# Patient Record
Sex: FEMALE | Race: OTHER | HISPANIC OR LATINO | Employment: FULL TIME | URBAN - METROPOLITAN AREA
[De-identification: names, ages, dates, MRNs, and addresses within clinical notes are randomized per-mention and may not be internally consistent; named-entity substitution may affect disease eponyms.]

---

## 2022-12-21 ENCOUNTER — APPOINTMENT (EMERGENCY)
Dept: CT IMAGING | Facility: HOSPITAL | Age: 32
End: 2022-12-21

## 2022-12-21 ENCOUNTER — HOSPITAL ENCOUNTER (EMERGENCY)
Facility: HOSPITAL | Age: 32
Discharge: HOME | End: 2022-12-21
Attending: EMERGENCY MEDICINE

## 2022-12-21 VITALS
SYSTOLIC BLOOD PRESSURE: 109 MMHG | DIASTOLIC BLOOD PRESSURE: 65 MMHG | HEART RATE: 72 BPM | RESPIRATION RATE: 16 BRPM | OXYGEN SATURATION: 97 % | TEMPERATURE: 97.9 F

## 2022-12-21 DIAGNOSIS — R51.9 HEADACHE: Primary | ICD-10-CM

## 2022-12-21 LAB
ALBUMIN SERPL BCP-MCNC: 3.5 G/DL (ref 3.5–5)
ALP SERPL-CCNC: 93 U/L (ref 46–116)
ALT SERPL W P-5'-P-CCNC: 23 U/L (ref 12–78)
ANION GAP SERPL CALCULATED.3IONS-SCNC: 9 MMOL/L (ref 4–13)
AST SERPL W P-5'-P-CCNC: 10 U/L (ref 5–45)
BASOPHILS # BLD AUTO: 0.03 THOUSANDS/ÂΜL (ref 0–0.1)
BASOPHILS NFR BLD AUTO: 1 % (ref 0–1)
BILIRUB SERPL-MCNC: 0.12 MG/DL (ref 0.2–1)
BUN SERPL-MCNC: 18 MG/DL (ref 5–25)
CALCIUM SERPL-MCNC: 9 MG/DL (ref 8.3–10.1)
CHLORIDE SERPL-SCNC: 101 MMOL/L (ref 96–108)
CO2 SERPL-SCNC: 28 MMOL/L (ref 21–32)
CREAT SERPL-MCNC: 0.96 MG/DL (ref 0.6–1.3)
EOSINOPHIL # BLD AUTO: 0.27 THOUSAND/ÂΜL (ref 0–0.61)
EOSINOPHIL NFR BLD AUTO: 4 % (ref 0–6)
ERYTHROCYTE [DISTWIDTH] IN BLOOD BY AUTOMATED COUNT: 11.5 % (ref 11.6–15.1)
EXT PREGNANCY TEST URINE: NEGATIVE
EXT. CONTROL: NORMAL
GFR SERPL CREATININE-BSD FRML MDRD: 77 ML/MIN/1.73SQ M
GLUCOSE SERPL-MCNC: 94 MG/DL (ref 65–140)
HCT VFR BLD AUTO: 36.8 % (ref 34.8–46.1)
HGB BLD-MCNC: 12.2 G/DL (ref 11.5–15.4)
IMM GRANULOCYTES # BLD AUTO: 0.02 THOUSAND/UL (ref 0–0.2)
IMM GRANULOCYTES NFR BLD AUTO: 0 % (ref 0–2)
LYMPHOCYTES # BLD AUTO: 2.71 THOUSANDS/ÂΜL (ref 0.6–4.47)
LYMPHOCYTES NFR BLD AUTO: 42 % (ref 14–44)
MAGNESIUM SERPL-MCNC: 1.8 MG/DL (ref 1.6–2.6)
MCH RBC QN AUTO: 29.9 PG (ref 26.8–34.3)
MCHC RBC AUTO-ENTMCNC: 33.2 G/DL (ref 31.4–37.4)
MCV RBC AUTO: 90 FL (ref 82–98)
MONOCYTES # BLD AUTO: 0.38 THOUSAND/ÂΜL (ref 0.17–1.22)
MONOCYTES NFR BLD AUTO: 6 % (ref 4–12)
NEUTROPHILS # BLD AUTO: 3.08 THOUSANDS/ÂΜL (ref 1.85–7.62)
NEUTS SEG NFR BLD AUTO: 47 % (ref 43–75)
NRBC BLD AUTO-RTO: 0 /100 WBCS
PLATELET # BLD AUTO: 349 THOUSANDS/UL (ref 149–390)
PMV BLD AUTO: 10.5 FL (ref 8.9–12.7)
POTASSIUM SERPL-SCNC: 3.8 MMOL/L (ref 3.5–5.3)
PROT SERPL-MCNC: 8 G/DL (ref 6.4–8.4)
RBC # BLD AUTO: 4.08 MILLION/UL (ref 3.81–5.12)
SODIUM SERPL-SCNC: 138 MMOL/L (ref 135–147)
WBC # BLD AUTO: 6.49 THOUSAND/UL (ref 4.31–10.16)

## 2022-12-21 RX ORDER — DIPHENHYDRAMINE HYDROCHLORIDE 50 MG/ML
25 INJECTION INTRAMUSCULAR; INTRAVENOUS ONCE
Status: COMPLETED | OUTPATIENT
Start: 2022-12-21 | End: 2022-12-21

## 2022-12-21 RX ORDER — ACETAMINOPHEN 325 MG/1
650 TABLET ORAL ONCE
Status: COMPLETED | OUTPATIENT
Start: 2022-12-21 | End: 2022-12-21

## 2022-12-21 RX ORDER — DEXAMETHASONE SODIUM PHOSPHATE 10 MG/ML
10 INJECTION, SOLUTION INTRAMUSCULAR; INTRAVENOUS ONCE
Status: COMPLETED | OUTPATIENT
Start: 2022-12-21 | End: 2022-12-21

## 2022-12-21 RX ORDER — MAGNESIUM SULFATE HEPTAHYDRATE 40 MG/ML
2 INJECTION, SOLUTION INTRAVENOUS ONCE
Status: COMPLETED | OUTPATIENT
Start: 2022-12-21 | End: 2022-12-21

## 2022-12-21 RX ORDER — BUTALBITAL, ACETAMINOPHEN AND CAFFEINE 50; 325; 40 MG/1; MG/1; MG/1
1 TABLET ORAL EVERY 8 HOURS PRN
Qty: 6 TABLET | Refills: 0 | Status: SHIPPED | OUTPATIENT
Start: 2022-12-21 | End: 2022-12-23

## 2022-12-21 RX ORDER — METOCLOPRAMIDE HYDROCHLORIDE 5 MG/ML
10 INJECTION INTRAMUSCULAR; INTRAVENOUS ONCE
Status: COMPLETED | OUTPATIENT
Start: 2022-12-21 | End: 2022-12-21

## 2022-12-21 RX ORDER — KETOROLAC TROMETHAMINE 30 MG/ML
30 INJECTION, SOLUTION INTRAMUSCULAR; INTRAVENOUS ONCE
Status: COMPLETED | OUTPATIENT
Start: 2022-12-21 | End: 2022-12-21

## 2022-12-21 RX ORDER — BUTALBITAL, ACETAMINOPHEN AND CAFFEINE 50; 325; 40 MG/1; MG/1; MG/1
1 TABLET ORAL ONCE
Status: COMPLETED | OUTPATIENT
Start: 2022-12-21 | End: 2022-12-21

## 2022-12-21 RX ORDER — ONDANSETRON 2 MG/ML
4 INJECTION INTRAMUSCULAR; INTRAVENOUS ONCE
Status: COMPLETED | OUTPATIENT
Start: 2022-12-21 | End: 2022-12-21

## 2022-12-21 RX ADMIN — BUTALBITAL, ACETAMINOPHEN AND CAFFEINE 1 TABLET: 50; 325; 40 TABLET ORAL at 19:08

## 2022-12-21 RX ADMIN — KETOROLAC TROMETHAMINE 30 MG: 30 INJECTION, SOLUTION INTRAMUSCULAR; INTRAVENOUS at 16:55

## 2022-12-21 RX ADMIN — MAGNESIUM SULFATE HEPTAHYDRATE 2 G: 40 INJECTION, SOLUTION INTRAVENOUS at 17:47

## 2022-12-21 RX ADMIN — DIPHENHYDRAMINE HYDROCHLORIDE 25 MG: 50 INJECTION, SOLUTION INTRAMUSCULAR; INTRAVENOUS at 16:54

## 2022-12-21 RX ADMIN — ACETAMINOPHEN 650 MG: 325 TABLET, FILM COATED ORAL at 17:46

## 2022-12-21 RX ADMIN — METOCLOPRAMIDE 10 MG: 5 INJECTION, SOLUTION INTRAMUSCULAR; INTRAVENOUS at 16:57

## 2022-12-21 RX ADMIN — SODIUM CHLORIDE 1000 ML: 0.9 INJECTION, SOLUTION INTRAVENOUS at 16:53

## 2022-12-21 RX ADMIN — ONDANSETRON 4 MG: 2 INJECTION INTRAMUSCULAR; INTRAVENOUS at 17:46

## 2022-12-21 RX ADMIN — DEXAMETHASONE SODIUM PHOSPHATE 10 MG: 10 INJECTION, SOLUTION INTRAMUSCULAR; INTRAVENOUS at 16:57

## 2022-12-21 NOTE — ED PROVIDER NOTES
History  Chief Complaint   Patient presents with   • Headache     Headache x2 weeks  Taking tylenol without relief  Patient is a 79-year-old female otherwise healthy with history of asthma only coming in today with a headache  Patient states that started approximately 2 weeks ago and has been constant since  She has been taking Tylenol with minimal relief  She states it was not sudden in onset  It was gradually worsening  She has no trauma, fevers, similar symptoms in family members  sHe states that 3 weeks ago she had the flu  She has no tenderness, amaurosis fugax, visual changes  She denies any focal weakness throughout the bilateral upper extremities or lower extremities  She denies any focal paresthesias throughout the bilateral lower extremities or upper extremities  She has no family members with arachnoid hemorrhage or sudden death   No CO exposure      History provided by:  Patient   used: No    Headache  Pain location:  Generalized  Quality:  Dull and sharp  Radiates to:  Does not radiate  Onset quality:  Gradual  Duration:  2 weeks  Timing:  Constant  Progression:  Unchanged  Chronicity:  New  Similar to prior headaches: yes    Context: not activity, not exposure to bright light, not caffeine, not coughing, not defecating, not eating, not stress, not exposure to cold air, not intercourse, not loud noise and not straining    Relieved by:  Nothing  Worsened by:  Nothing  Ineffective treatments:  Acetaminophen  Associated symptoms: photophobia    Associated symptoms: no abdominal pain, no back pain, no blurred vision, no congestion, no cough, no diarrhea, no dizziness, no drainage, no ear pain, no eye pain, no facial pain, no fatigue, no fever, no focal weakness, no hearing loss, no loss of balance, no myalgias, no nausea, no near-syncope, no neck pain, no neck stiffness, no numbness, no paresthesias, no seizures, no sinus pressure, no sore throat, no swollen glands, no syncope, no tingling, no URI, no visual change, no vomiting and no weakness    Risk factors: no anger, no family hx of SAH, does not have insomnia and lifestyle not sedentary        None       Past Medical History:   Diagnosis Date   • Asthma        Past Surgical History:   Procedure Laterality Date   •  SECTION         History reviewed  No pertinent family history  I have reviewed and agree with the history as documented  E-Cigarette/Vaping   • E-Cigarette Use Never User      E-Cigarette/Vaping Substances     Social History     Tobacco Use   • Smoking status: Never   • Smokeless tobacco: Never   Vaping Use   • Vaping Use: Never used   Substance Use Topics   • Alcohol use: Yes   • Drug use: Never       Review of Systems   Constitutional: Negative  Negative for chills, fatigue and fever  HENT: Negative  Negative for congestion, ear pain, hearing loss, postnasal drip, sinus pressure and sore throat  Eyes: Positive for photophobia  Negative for blurred vision, pain and visual disturbance  Respiratory: Negative  Negative for cough and shortness of breath  Cardiovascular: Negative  Negative for chest pain, palpitations, syncope and near-syncope  Gastrointestinal: Negative  Negative for abdominal pain, diarrhea, nausea and vomiting  Genitourinary: Negative for dysuria and hematuria  Musculoskeletal: Negative  Negative for arthralgias, back pain, myalgias, neck pain and neck stiffness  Skin: Negative  Negative for color change and rash  Neurological: Positive for headaches  Negative for dizziness, focal weakness, seizures, syncope, weakness, numbness, paresthesias and loss of balance  Hematological: Negative  Psychiatric/Behavioral: Negative  All other systems reviewed and are negative  Physical Exam  Physical Exam  Vitals and nursing note reviewed  Constitutional:       General: She is not in acute distress  Appearance: She is well-developed     HENT:      Head: Normocephalic and atraumatic  Comments: Patient maintaining airway and secretions  No stridor   No brawniness under tongue  Mouth/Throat:      Mouth: Mucous membranes are moist    Eyes:      Extraocular Movements: Extraocular movements intact  Conjunctiva/sclera: Conjunctivae normal       Pupils: Pupils are equal, round, and reactive to light  Cardiovascular:      Rate and Rhythm: Normal rate and regular rhythm  Pulses:           Radial pulses are 2+ on the right side and 2+ on the left side  Dorsalis pedis pulses are 2+ on the right side and 2+ on the left side  Heart sounds: Normal heart sounds, S1 normal and S2 normal  No murmur heard  Pulmonary:      Effort: Pulmonary effort is normal  No respiratory distress  Breath sounds: Normal breath sounds  Abdominal:      Palpations: Abdomen is soft  Tenderness: There is no abdominal tenderness  Musculoskeletal:         General: No swelling  Normal range of motion  Cervical back: Neck supple  Skin:     General: Skin is warm and dry  Capillary Refill: Capillary refill takes less than 2 seconds  Neurological:      General: No focal deficit present  Mental Status: She is alert and oriented to person, place, and time  GCS: GCS eye subscore is 4  GCS verbal subscore is 5  GCS motor subscore is 6  Cranial Nerves: Cranial nerves 2-12 are intact  Sensory: Sensation is intact  Motor: Motor function is intact  No weakness  Coordination: Coordination is intact  Comments: Cranial nerves 2-12 grossly intact  EOMI  PERRLA  No slurred speech  No facial asymmetry  No tongue deviation  Negative pronator drift  No nystagmus  Patient can move bilateral upper extremities and lower extremities independently of each other pain-free with full active range of motion throughout the bilateral shoulders, elbows, wrists, hips, knees and ankles      Manual muscle grade 5/5 throughout the bilateral upper extremities and lower extremities  Psychiatric:         Mood and Affect: Mood normal          Behavior: Behavior normal          Thought Content:  Thought content normal          Judgment: Judgment normal          Vital Signs  ED Triage Vitals [12/21/22 1602]   Temperature Pulse Respirations Blood Pressure SpO2   97 9 °F (36 6 °C) 86 18 115/55 99 %      Temp src Heart Rate Source Patient Position - Orthostatic VS BP Location FiO2 (%)   -- Monitor Sitting Right arm --      Pain Score       8           Vitals:    12/21/22 1602 12/21/22 1918   BP: 115/55 109/65   Pulse: 86 72   Patient Position - Orthostatic VS: Sitting Lying         Visual Acuity  Visual Acuity    Flowsheet Row Most Recent Value   L Pupil Size (mm) 4   R Pupil Size (mm) 4          ED Medications  Medications   metoclopramide (REGLAN) injection 10 mg (10 mg Intravenous Given 12/21/22 1657)   diphenhydrAMINE (BENADRYL) injection 25 mg (25 mg Intravenous Given 12/21/22 1654)   ketorolac (TORADOL) injection 30 mg (30 mg Intravenous Given 12/21/22 1655)   dexamethasone (PF) (DECADRON) injection 10 mg (10 mg Intravenous Given 12/21/22 1657)   sodium chloride 0 9 % bolus 1,000 mL (0 mL Intravenous Stopped 12/21/22 1753)   magnesium sulfate 2 g/50 mL IVPB (premix) 2 g (0 g Intravenous Stopped 12/21/22 1847)   ondansetron (ZOFRAN) injection 4 mg (4 mg Intravenous Given 12/21/22 1746)   acetaminophen (TYLENOL) tablet 650 mg (650 mg Oral Given 12/21/22 1746)   butalbital-acetaminophen-caffeine (FIORICET,ESGIC) -40 mg per tablet 1 tablet (1 tablet Oral Given 12/21/22 1908)       Diagnostic Studies  Results Reviewed     Procedure Component Value Units Date/Time    Comprehensive metabolic panel [[id number]]  (Abnormal) Collected: 12/21/22 1652    Lab Status: Final result Specimen: Blood from Arm, Left Updated: 12/21/22 1734     Sodium 138 mmol/L      Potassium 3 8 mmol/L      Chloride 101 mmol/L      CO2 28 mmol/L      ANION GAP 9 mmol/L BUN 18 mg/dL      Creatinine 0 96 mg/dL      Glucose 94 mg/dL      Calcium 9 0 mg/dL      AST 10 U/L      ALT 23 U/L      Alkaline Phosphatase 93 U/L      Total Protein 8 0 g/dL      Albumin 3 5 g/dL      Total Bilirubin 0 12 mg/dL      eGFR 77 ml/min/1 73sq m     Narrative:      [place] guidelines for Chronic Kidney Disease (CKD):   •  Stage 1 with normal or high GFR (GFR > 90 mL/min/1 73 square meters)  •  Stage 2 Mild CKD (GFR = 60-89 mL/min/1 73 square meters)  •  Stage 3A Moderate CKD (GFR = 45-59 mL/min/1 73 square meters)  •  Stage 3B Moderate CKD (GFR = 30-44 mL/min/1 73 square meters)  •  Stage 4 Severe CKD (GFR = 15-29 mL/min/1 73 square meters)  •  Stage 5 End Stage CKD (GFR <15 mL/min/1 73 square meters)  Note: GFR calculation is accurate only with a steady state creatinine    Magnesium [[id number]]  (Normal) Collected: 12/21/22 1652    Lab Status: Final result Specimen: Blood from Arm, Left Updated: 12/21/22 1734     Magnesium 1 8 mg/dL     CBC and differential [[id number]]  (Abnormal) Collected: 12/21/22 1652    Lab Status: Final result Specimen: Blood from Arm, Left Updated: 12/21/22 1704     WBC 6 49 Thousand/uL      RBC 4 08 Million/uL      Hemoglobin 12 2 g/dL      Hematocrit 36 8 %      MCV 90 fL      MCH 29 9 pg      MCHC 33 2 g/dL      RDW 11 5 %      MPV 10 5 fL      Platelets 364 Thousands/uL      nRBC 0 /100 WBCs      Neutrophils Relative 47 %      Immat GRANS % 0 %      Lymphocytes Relative 42 %      Monocytes Relative 6 %      Eosinophils Relative 4 %      Basophils Relative 1 %      Neutrophils Absolute 3 08 Thousands/µL      Immature Grans Absolute 0 02 Thousand/uL      Lymphocytes Absolute 2 71 Thousands/µL      Monocytes Absolute 0 38 Thousand/µL      Eosinophils Absolute 0 27 Thousand/µL      Basophils Absolute 0 03 Thousands/µL     POCT pregnancy, urine [[id number]]  (Normal) Resulted: 12/21/22 1645    Lab Status: Final result Updated: 12/21/22 1645     EXT Preg Test, Ur Negative     Control Valid                 CT head without contrast   Final Result by Doni Parisi MD (12/21 1818)   1  Significant pansinus mucosal thickening with air-fluid levels  Correlation for signs of acute sinusitis is recommended  2   No acute intracranial abnormality  Workstation performed: [id number]                    Procedures  Procedures         ED Course  ED Course as of 12/21/22 1944   Wed Dec 21, 2022   1619 Patient is a 11-year-old female coming in today with 2 weeks of headache  On exam she is photophobic otherwise neuro intact with NIH 0  We will start with migraine cocktail and check basic labs  Disclosure: Voice to text software was used in the preparation of this document and could have resulted in translational errors       Occasional wrong word or "sound a like" substitutions may have occurred due to the inherent limitations of voice recognition software   Read the chart carefully and recognize, using context, where substitutions have occurred         1734 Patient's labs are stable  No evidence of endorgan damage  Headache still present but not as intense  We will repeat medication   1832 CT without acute pathology  Patient does have formation of the sinuses however is asymptomatic on physical exam    1842 Patient updated on CT and labs  She is feeling better  Discussion with patient will need follow-up with PCP for stable preventative medications as well as headache journal, daily magnesium to help prevent migraines  DC home and give Fioricet as well  Remains dimetacrine no focal deficits        Disclosure: Voice to text software was used in the preparation of this document and could have resulted in translational errors       Occasional wrong word or "sound a like" substitutions may have occurred due to the inherent limitations of voice recognition software   Read the chart carefully and recognize, using context, where substitutions have occurred                        Stroke Assessment     Row Name 12/21/22 1622             NIH Stroke Scale    Interval Baseline      Level of Consciousness (1a ) 0      LOC Questions (1b ) 0      LOC Commands (1c ) 0      Best Gaze (2 ) 0      Visual (3 ) 0      Facial Palsy (4 ) 0      Motor Arm, Left (5a ) 0      Motor Arm, Right (5b ) 0      Motor Leg, Left (6a ) 0      Motor Leg, Right (6b ) 0      Limb Ataxia (7 ) 0      Sensory (8 ) 0      Best Language (9 ) 0      Dysarthria (10 ) 0      Extinction and Inattention (11 ) (Formerly Neglect) 0      Total 0              Flowsheet Row Most Recent Value   Thrombolytic Decision Options    Thrombolytic Decision Patient not a candidate  Patient is not a candidate options Unclear time of onset outside appropriate time window  MDM  Number of Diagnoses or Management Options  Diagnosis management comments:     Patient's headache is similar to prior chronic headaches  There are no focal neurologic findings on exam   The headache was not sudden in onset and was not maximum intensity at onset  Patient does not have a fever and is not immunocompromised  Headache has not been progressively worsening  Patient denies jaw claudication, muscle aches or temporal artery pain  Doubt carbon monoxide poisoning as there are not multiple patients with similar complaints in the home  Patient is not pregnant and is not newly post pregnancy  Patient denies any history of clotting disorders  Patient denies trauma  Patient denies eye pain  Patient denies any cervical manipulation with facial pain or headache  Patient denies dizziness with headache           Amount and/or Complexity of Data Reviewed  Clinical lab tests: ordered and reviewed  Tests in the medicine section of CPT®: reviewed and ordered        Disposition  Final diagnoses:   Headache     Time reflects when diagnosis was documented in both MDM as applicable and the Disposition within this note     Time User Action Codes Description Comment    12/21/2022  5:53 PM Cyrus Hoang Add [R51 9] Headache       ED Disposition     ED Disposition   Discharge    Condition   Stable    Date/Time   Wed Dec 21, 2022  6:52 PM    Comment   Arlean Bound discharge to home/self care  Follow-up Information     Follow up With Specialties Details Why Contact Info Additional [facility] Schedule an appointment as soon as possible for a visit in 3 days  [address], [address] [facility] [id number]  [address], [address], [address] [place], [place], [address]          Patient's Medications   Discharge Prescriptions    BUTALBITAL-ACETAMINOPHEN-CAFFEINE (ESGIC) -40 MG PER TABLET    Take 1 tablet by mouth every 8 (eight) hours as needed for headaches for up to 2 days       Start Date: 12/21/2022End Date: 12/23/2022       Order Dose: 1 tablet       Quantity: 6 tablet    Refills: 0       No discharge procedures on file      PDMP Review     None          ED Provider  Electronically Signed by           Nico Kamara DO  12/21/22 1944

## 2022-12-21 NOTE — DISCHARGE INSTRUCTIONS
PLEASE CALL YOUR FAMILY DOCTOR FOR CLOSE FOLLOW UP  START TAKING OVER THE COUNTER MAGNESIUM DAILY TO HELP PREVENT HEADACHES  START A HEADACHE JOURNAL - DOCUMENT WHAT FOODS/DRINKS/DAYS HAVE THE POTENTIAL TO MAKE YOUR HEADACHE WORSE  WHILE TAKING ESGIC - DO NOT TAKE TYLENOL WITH IT AS IT CONTAINS TYLENOL

## 2023-10-10 ENCOUNTER — VBI (OUTPATIENT)
Dept: ADMINISTRATIVE | Facility: OTHER | Age: 33
End: 2023-10-10

## 2024-01-18 ENCOUNTER — OFFICE VISIT (OUTPATIENT)
Dept: FAMILY MEDICINE CLINIC | Facility: CLINIC | Age: 34
End: 2024-01-18
Payer: COMMERCIAL

## 2024-01-18 VITALS
HEIGHT: 63 IN | TEMPERATURE: 99.1 F | WEIGHT: 139 LBS | SYSTOLIC BLOOD PRESSURE: 110 MMHG | BODY MASS INDEX: 24.63 KG/M2 | DIASTOLIC BLOOD PRESSURE: 80 MMHG | HEART RATE: 100 BPM | RESPIRATION RATE: 16 BRPM | OXYGEN SATURATION: 99 %

## 2024-01-18 DIAGNOSIS — Z13.220 LIPID SCREENING: ICD-10-CM

## 2024-01-18 DIAGNOSIS — J02.0 STREP THROAT: ICD-10-CM

## 2024-01-18 DIAGNOSIS — R70.0 ELEVATED SED RATE: ICD-10-CM

## 2024-01-18 DIAGNOSIS — Z23 ENCOUNTER FOR IMMUNIZATION: ICD-10-CM

## 2024-01-18 DIAGNOSIS — M25.531 RIGHT WRIST PAIN: Primary | ICD-10-CM

## 2024-01-18 DIAGNOSIS — J45.40 MODERATE PERSISTENT ASTHMA, UNSPECIFIED WHETHER COMPLICATED: ICD-10-CM

## 2024-01-18 DIAGNOSIS — L50.9 URTICARIA: ICD-10-CM

## 2024-01-18 DIAGNOSIS — J34.2 DEVIATED SEPTUM: ICD-10-CM

## 2024-01-18 PROBLEM — J30.89 NON-SEASONAL ALLERGIC RHINITIS: Status: ACTIVE | Noted: 2023-03-15

## 2024-01-18 PROCEDURE — 99204 OFFICE O/P NEW MOD 45 MIN: CPT | Performed by: PHYSICIAN ASSISTANT

## 2024-01-18 PROCEDURE — 90471 IMMUNIZATION ADMIN: CPT

## 2024-01-18 PROCEDURE — 87070 CULTURE OTHR SPECIMN AEROBIC: CPT | Performed by: PHYSICIAN ASSISTANT

## 2024-01-18 PROCEDURE — 90686 IIV4 VACC NO PRSV 0.5 ML IM: CPT

## 2024-01-18 RX ORDER — FLUTICASONE PROPIONATE 50 MCG
SPRAY, SUSPENSION (ML) NASAL
COMMUNITY
Start: 2023-12-19

## 2024-01-18 RX ORDER — MONTELUKAST SODIUM 10 MG/1
10 TABLET ORAL
COMMUNITY

## 2024-01-18 RX ORDER — ALBUTEROL SULFATE 90 UG/1
AEROSOL, METERED RESPIRATORY (INHALATION)
COMMUNITY
Start: 2023-10-31

## 2024-01-18 RX ORDER — CETIRIZINE HYDROCHLORIDE 10 MG/1
10 TABLET ORAL DAILY
COMMUNITY

## 2024-01-18 NOTE — PROGRESS NOTES
Name: Cass Orozco      : 1990      MRN: [id number]  Encounter Provider: Annabelle Jones PA-C  Encounter Date: 2024   Encounter department: [facility] [place]    Assessment & Plan     1. Right wrist pain  -     Ambulatory Referral to Physical Therapy; Future    2. Urticaria  -     Ambulatory Referral to Dermatology; Future    3. Moderate persistent asthma, unspecified whether complicated    4. Deviated septum    5. Lipid screening  -     Lipid panel    6. Elevated sed rate  -     Sedimentation rate, automated    7. Encounter for immunization  -     influenza vaccine, quadrivalent, 0.5 mL, preservative-free, for adult and pediatric patients 6 mos+ (AFLURIA, FLUARIX, FLULAVAL, FLUZONE)    8. Strep throat  -     Throat culture; Future  -     Throat culture    -For some reason initially her records did not crossover so I was able to visualize records she had on her phone with regards to blood work.  IT at [facility] will be notified to cross her records.  CRP, CMP, CBC are good  Sed rate was mildly elevated at 24 and LV normal ranges up to 20  I did recommend she repeat a sed rate  She also requested a lipid screen  - Refer to physical therapy for right wrist pain  - I did recommend a throat culture to be sent to the lab for verification of strep since the rapid strep came back negative.  She was also on amoxicillin temporarily which may have actually skewed the results.  She had leftover amoxicillin at home.  Concerned of recurrent hives  - Refer to dermatology  - Flu vaccine today  - Continue cetirizine 10 mg daily  Continue Singulair 10 mg daily  Continue Flonase daily.  These medications were prescribed by the allergist  - She states that she only utilizes her inhaler more than twice a week when she is not feeling well.  I did advise her if her use is persistent then she should let me know which would indicate that her asthma is not well-controlled  - I did  recommend follow-up in 6 weeks to include her annual physical    M*Modal software was used to dictate this note. It may contain errors with dictating incorrect words/spelling. Please contact provider directly for any questions.            Subjective      Patient presents today to establish care.  She was previously going to a provider at [facility].    She has concerns about recurrent hives.  She states that she went to [facility] on 12/12/2024 for swelling of her face and hives.  She states that she was exposed to a product at work called [employer] when she initially got the hives.  She states that her employer at [employer] now changed her to a different location so she is no longer exposed to that product.  She continues to get hives regardless of new placement.  She is did see an allergist on 1/9/2024 Dr. ADAM Lewis who did skin testing which she states was negative.  She states that she had blood work done for allergies also which came back positive for dog dander.  She has been taking cetirizine 10 mg daily which has helped resolve the hives.  She states that soon as she forgets to take the medication the hives return.  She did have a rapid strep done in the office at her previous PCP which was negative.  It was never sent for final culture.  She states also prior to her hives she did have upper respiratory infection along with a sore throat which resolved with medication that she took that she had at home including amoxicillin.    She also complains of right wrist pain for about 1 month.  She is right-handed.  She denies any injury.  She does notice pain and she points to the ulnar aspect.  She states her previous PCP did blood work on her including a sed rate which was mildly elevated.  She states that she was told that there are concerns about her immune system.    Review of Systems   Constitutional: Negative.    HENT:  Negative for congestion, rhinorrhea and sore throat.    Respiratory:  " Negative for cough and shortness of breath.    Skin:         She did show me photos from when she had hives in December.  She did have a urticarial lesions in multiple places of her body.  She also had facial swelling that is now resolved.       Current Outpatient Medications on File Prior to Visit   Medication Sig    albuterol (PROVENTIL HFA,VENTOLIN HFA) 90 mcg/act inhaler INHALE 2 PUFFS EVERY 6 HOURS AS NEEDED FOR WHEEZING    cetirizine (ZyrTEC) 10 mg tablet Take 10 mg by mouth daily    fluticasone (FLONASE) 50 mcg/act nasal spray SPRAY 2 SPRAYS INTO EACH NOSTRIL EVERY DAY    montelukast (SINGULAIR) 10 mg tablet Take 10 mg by mouth daily at bedtime    Cholecalciferol 125 MCG (5000 UT) TABS Take 5,000 Units by mouth daily    naproxen (Naprosyn) 500 mg tablet Take 1 tablet (500 mg total) by mouth 2 (two) times a day with meals (Patient not taking: Reported on 1/18/2024)    [DISCONTINUED] lidocaine (LMX) 4 % cream Apply topically as needed for mild pain (Patient not taking: Reported on 1/18/2024)    [DISCONTINUED] methocarbamol (ROBAXIN) 500 mg tablet Take 1 tablet (500 mg total) by mouth 4 (four) times a day for 10 days (Patient not taking: Reported on 1/18/2024)       Objective     /80   Pulse 100   Temp 99.1 °F (37.3 °C)   Resp 16   Ht 5' 3\" (1.6 m)   Wt 63 kg (139 lb)   SpO2 99%   BMI 24.62 kg/m²     Physical Exam  Vitals reviewed.   Constitutional:       General: She is not in acute distress.     Appearance: Normal appearance. She is well-developed. She is not ill-appearing, toxic-appearing or diaphoretic.   HENT:      Head: Normocephalic and atraumatic.      Right Ear: Tympanic membrane normal. There is no impacted cerumen.      Left Ear: Tympanic membrane normal. There is no impacted cerumen.      Mouth/Throat:      Mouth: Mucous membranes are moist.      Pharynx: No oropharyngeal exudate.   Neck:      Thyroid: No thyromegaly.   Cardiovascular:      Rate and Rhythm: Normal rate and regular " rhythm.      Heart sounds: Normal heart sounds. No murmur heard.     No friction rub. No gallop.   Pulmonary:      Effort: Pulmonary effort is normal. No respiratory distress.      Breath sounds: Normal breath sounds. No wheezing, rhonchi or rales.   Abdominal:      General: Bowel sounds are normal.      Palpations: Abdomen is soft. There is no mass.      Tenderness: There is no abdominal tenderness.   Musculoskeletal:         General: No deformity.      Cervical back: Neck supple.      Right lower leg: No edema.      Left lower leg: No edema.      Comments: Right wrist: No swelling, good range of motion, she does have pain with ulnar deviation and tenderness over the ulnar aspect of her wrist.  Distal neurovascular status is intact.   Lymphadenopathy:      Cervical: Cervical adenopathy (Mild cervical adenopathy anteriorly bilaterally.  No palpable supraclavicular adenopathy.) present.   Skin:     General: Skin is warm.   Neurological:      General: No focal deficit present.      Mental Status: She is alert.   Psychiatric:         Mood and Affect: Mood normal.         Behavior: Behavior normal.         Thought Content: Thought content normal.         Judgment: Judgment normal.       Annabelle Jones PA-C

## 2024-01-21 LAB — BACTERIA THROAT CULT: NORMAL
